# Patient Record
Sex: MALE | Race: WHITE | NOT HISPANIC OR LATINO | Employment: FULL TIME | URBAN - METROPOLITAN AREA
[De-identification: names, ages, dates, MRNs, and addresses within clinical notes are randomized per-mention and may not be internally consistent; named-entity substitution may affect disease eponyms.]

---

## 2022-05-31 ENCOUNTER — APPOINTMENT (OUTPATIENT)
Dept: CT IMAGING | Facility: CLINIC | Age: 42
End: 2022-05-31
Attending: EMERGENCY MEDICINE
Payer: COMMERCIAL

## 2022-05-31 ENCOUNTER — HOSPITAL ENCOUNTER (EMERGENCY)
Facility: CLINIC | Age: 42
Discharge: HOME | End: 2022-05-31
Attending: EMERGENCY MEDICINE | Admitting: EMERGENCY MEDICINE
Payer: COMMERCIAL

## 2022-05-31 VITALS
HEIGHT: 70 IN | DIASTOLIC BLOOD PRESSURE: 85 MMHG | BODY MASS INDEX: 23.62 KG/M2 | RESPIRATION RATE: 18 BRPM | OXYGEN SATURATION: 100 % | TEMPERATURE: 98.3 F | HEART RATE: 67 BPM | SYSTOLIC BLOOD PRESSURE: 126 MMHG | WEIGHT: 165 LBS

## 2022-05-31 DIAGNOSIS — J69.0 ASPIRATION PNEUMONITIS (H): ICD-10-CM

## 2022-05-31 DIAGNOSIS — K20.90 ESOPHAGITIS: ICD-10-CM

## 2022-05-31 DIAGNOSIS — R07.9 CHEST PAIN, UNSPECIFIED TYPE: ICD-10-CM

## 2022-05-31 LAB
ANION GAP SERPL CALCULATED.3IONS-SCNC: 7 MMOL/L (ref 3–14)
ATRIAL RATE - MUSE: 54 BPM
BASOPHILS # BLD AUTO: 0 10E3/UL (ref 0–0.2)
BASOPHILS NFR BLD AUTO: 1 %
BUN SERPL-MCNC: 20 MG/DL (ref 7–30)
CALCIUM SERPL-MCNC: 10 MG/DL (ref 8.5–10.1)
CHLORIDE BLD-SCNC: 101 MMOL/L (ref 94–109)
CO2 SERPL-SCNC: 29 MMOL/L (ref 20–32)
CREAT SERPL-MCNC: 0.88 MG/DL (ref 0.66–1.25)
DIASTOLIC BLOOD PRESSURE - MUSE: NORMAL MMHG
EOSINOPHIL # BLD AUTO: 0.1 10E3/UL (ref 0–0.7)
EOSINOPHIL NFR BLD AUTO: 2 %
ERYTHROCYTE [DISTWIDTH] IN BLOOD BY AUTOMATED COUNT: 12.8 % (ref 10–15)
GFR SERPL CREATININE-BSD FRML MDRD: >90 ML/MIN/1.73M2
GLUCOSE BLD-MCNC: 93 MG/DL (ref 70–99)
HCT VFR BLD AUTO: 45.7 % (ref 40–53)
HGB BLD-MCNC: 15.3 G/DL (ref 13.3–17.7)
HOLD SPECIMEN: NORMAL
HOLD SPECIMEN: NORMAL
IMM GRANULOCYTES # BLD: 0 10E3/UL
IMM GRANULOCYTES NFR BLD: 1 %
INTERPRETATION ECG - MUSE: NORMAL
LYMPHOCYTES # BLD AUTO: 1.3 10E3/UL (ref 0.8–5.3)
LYMPHOCYTES NFR BLD AUTO: 21 %
MAGNESIUM SERPL-MCNC: 2.6 MG/DL (ref 1.6–2.3)
MCH RBC QN AUTO: 30.5 PG (ref 26.5–33)
MCHC RBC AUTO-ENTMCNC: 33.5 G/DL (ref 31.5–36.5)
MCV RBC AUTO: 91 FL (ref 78–100)
MONOCYTES # BLD AUTO: 0.5 10E3/UL (ref 0–1.3)
MONOCYTES NFR BLD AUTO: 8 %
NEUTROPHILS # BLD AUTO: 4.4 10E3/UL (ref 1.6–8.3)
NEUTROPHILS NFR BLD AUTO: 67 %
NRBC # BLD AUTO: 0 10E3/UL
NRBC BLD AUTO-RTO: 0 /100
P AXIS - MUSE: 49 DEGREES
PLATELET # BLD AUTO: 223 10E3/UL (ref 150–450)
POTASSIUM BLD-SCNC: 3.5 MMOL/L (ref 3.4–5.3)
PR INTERVAL - MUSE: 154 MS
QRS DURATION - MUSE: 106 MS
QT - MUSE: 432 MS
QTC - MUSE: 409 MS
R AXIS - MUSE: 66 DEGREES
RBC # BLD AUTO: 5.01 10E6/UL (ref 4.4–5.9)
SODIUM SERPL-SCNC: 137 MMOL/L (ref 133–144)
SYSTOLIC BLOOD PRESSURE - MUSE: NORMAL MMHG
T AXIS - MUSE: 55 DEGREES
TROPONIN I SERPL HS-MCNC: <3 NG/L
VENTRICULAR RATE- MUSE: 54 BPM
WBC # BLD AUTO: 6.4 10E3/UL (ref 4–11)

## 2022-05-31 PROCEDURE — 80048 BASIC METABOLIC PNL TOTAL CA: CPT | Performed by: EMERGENCY MEDICINE

## 2022-05-31 PROCEDURE — 85025 COMPLETE CBC W/AUTO DIFF WBC: CPT | Performed by: EMERGENCY MEDICINE

## 2022-05-31 PROCEDURE — 250N000013 HC RX MED GY IP 250 OP 250 PS 637: Performed by: EMERGENCY MEDICINE

## 2022-05-31 PROCEDURE — 83735 ASSAY OF MAGNESIUM: CPT | Performed by: EMERGENCY MEDICINE

## 2022-05-31 PROCEDURE — 250N000009 HC RX 250: Performed by: EMERGENCY MEDICINE

## 2022-05-31 PROCEDURE — 70491 CT SOFT TISSUE NECK W/DYE: CPT

## 2022-05-31 PROCEDURE — 71260 CT THORAX DX C+: CPT

## 2022-05-31 PROCEDURE — 99285 EMERGENCY DEPT VISIT HI MDM: CPT | Mod: 25

## 2022-05-31 PROCEDURE — 84484 ASSAY OF TROPONIN QUANT: CPT | Performed by: EMERGENCY MEDICINE

## 2022-05-31 PROCEDURE — 250N000011 HC RX IP 250 OP 636: Performed by: EMERGENCY MEDICINE

## 2022-05-31 PROCEDURE — 36415 COLL VENOUS BLD VENIPUNCTURE: CPT | Performed by: EMERGENCY MEDICINE

## 2022-05-31 PROCEDURE — 93005 ELECTROCARDIOGRAM TRACING: CPT

## 2022-05-31 RX ORDER — IOPAMIDOL 755 MG/ML
500 INJECTION, SOLUTION INTRAVASCULAR ONCE
Status: COMPLETED | OUTPATIENT
Start: 2022-05-31 | End: 2022-05-31

## 2022-05-31 RX ORDER — ONDANSETRON 4 MG/1
4 TABLET, ORALLY DISINTEGRATING ORAL EVERY 8 HOURS PRN
Qty: 10 TABLET | Refills: 0 | Status: SHIPPED | OUTPATIENT
Start: 2022-05-31 | End: 2022-06-03

## 2022-05-31 RX ADMIN — AMOXICILLIN AND CLAVULANATE POTASSIUM 1 TABLET: 875; 125 TABLET, FILM COATED ORAL at 19:05

## 2022-05-31 RX ADMIN — SODIUM CHLORIDE 65 ML: 9 INJECTION, SOLUTION INTRAVENOUS at 16:45

## 2022-05-31 RX ADMIN — IOPAMIDOL 100 ML: 755 INJECTION, SOLUTION INTRAVENOUS at 16:45

## 2022-05-31 ASSESSMENT — ENCOUNTER SYMPTOMS
CHEST TIGHTNESS: 1
APPETITE CHANGE: 1
VOMITING: 1
SHORTNESS OF BREATH: 0
NAUSEA: 1

## 2022-05-31 NOTE — ED PROVIDER NOTES
History   Chief Complaint:  Chest Pain     The history is provided by the patient.      Vincent Porras is a 41 year old male with history of heartburn who presents with chest pain. Patient reports that at around 1030 today he was experiencing left sided chest pain that began to radiate towards the right side. This exacerbation lasted for 30 minutes, went away, then came back half as painful.  Approximately 15 minutes into the first exacerbation, patient states that the pain radiated to underneath his chin. Reports that deep breaths are accompanied by tightness and a tingling feeling behind his sternum.     States that two days ago he had multiple episodes of vomiting over a 3-4 hour period and has a loss of appetite since then. Patient experienced little to no chest pain yesterday. Denies experiencing trouble breathing, cold sweat, residual nausea, history of food sensitivity/allergies. Denies anyone else in household experiencing same symptoms.  Of note, patient stopped regularly smoking cigarettes 8-10 years ago and smokes e cigarettes currently. He casually smokes cigarettes. Reports former use of omeprazole but has not taken it for a few years.    Review of Systems   Constitutional: Positive for appetite change.   Respiratory: Positive for chest tightness. Negative for shortness of breath.    Cardiovascular: Positive for chest pain.   Gastrointestinal: Positive for nausea (resolved) and vomiting (resolved).   All other systems reviewed and are negative.      Allergies:  No Known Allergies    Medications:  Mucinex    Past Medical History:     Heartburn  ADHD  Kidney stone    Past Surgical History:    No past surgical history on file.     Family History:    Father- heart murmur, extra beats, stent placement  Mother- DM II    Social History:  Presents with wife  Presented by car    Physical Exam     Patient Vitals for the past 24 hrs:   BP Temp Pulse Resp SpO2 Height Weight   05/31/22 1900 126/85 -- 67 -- 100 % --  "--   05/31/22 1858 -- -- -- 18 -- -- --   05/31/22 1800 120/80 -- 64 -- 99 % -- --   05/31/22 1700 118/80 -- 56 -- 100 % -- --   05/31/22 1345 121/85 98.3  F (36.8  C) 60 16 100 % 1.778 m (5' 10\") 74.8 kg (165 lb)       Physical Exam  Constitutional: Vital signs reviewed as above.   Head: No external signs of trauma noted.  Eyes: Pupils are equal, round, and reactive to light.   Neck: No JVD noted. No subcutaneous crepitus noted  Cardiovascular: Normal rate on exam, regular rhythm and normal heart sounds.  No murmur heard. Equal B/L peripheral pulses.  Pulmonary/Chest: Effort normal and breath sounds normal. No respiratory distress. Patient has no wheezes. Patient has no rales. No chest crepitus noted.  Gastrointestinal: Soft. There is no tenderness.   Musculoskeletal/Extremities: No edema noted. Normal tone.  Neurological: Patient is alert and oriented to person, place, and time.   Skin: Skin is warm and dry. There is no diaphoresis noted.   Psychiatric: The patient appears calm.    Emergency Department Course   ECG  Reviewed by Dr. Davis at 1546  ECG results from 05/31/22   EKG 12-lead, tracing only     Value    Systolic Blood Pressure     Diastolic Blood Pressure     Ventricular Rate 54    Atrial Rate 54    MI Interval 154    QRS Duration 106        QTc 409    P Axis 49    R AXIS 66    T Axis 55    Interpretation ECG      Sinus bradycardia  Otherwise normal ECG  No previous ECGs available       Imaging:  Chest CT w IV contrast only, TRAUMA / DISSECTION   Final Result   IMPRESSION:       1.  Patchy left lung groundglass opacities within which there are several inflammatory nodules one of which has a central lucency consistent with cavitation. There are additional atelectatic bands in the lingula and left lower lobe. Given the provided    clinical history, findings most likely represent aspiration pneumonitis/pneumonia.   2.  Circumferential wall thickening of the distal esophagus consistent with esophagitis. " No findings to suggest esophageal perforation.      3.  Nonobstructing left upper pole renal calculi.      Soft tissue neck CT w contrast   Final Result   IMPRESSION:     1. No evidence of acute infectious or inflammatory process within the   neck.   2. No evidence of neck mass or cervical lymphadenopathy.   3. Multiple abnormal pulmonary opacities/nodules. Refer to chest   report.       LAM PARK MD            SYSTEM ID:  [id number]        Report per radiology    Laboratory:  Labs Ordered and Resulted from Time of ED Arrival to Time of ED Departure   MAGNESIUM - Abnormal       Result Value    Magnesium 2.6 (*)    BASIC METABOLIC PANEL - Normal    Sodium 137      Potassium 3.5      Chloride 101      Carbon Dioxide (CO2) 29      Anion Gap 7      Urea Nitrogen 20      Creatinine 0.88      Calcium 10.0      Glucose 93      GFR Estimate >90     TROPONIN I - Normal    Troponin I High Sensitivity <3     CBC WITH PLATELETS AND DIFFERENTIAL    WBC Count 6.4      RBC Count 5.01      Hemoglobin 15.3      Hematocrit 45.7      MCV 91      MCH 30.5      MCHC 33.5      RDW 12.8      Platelet Count 223      % Neutrophils 67      % Lymphocytes 21      % Monocytes 8      % Eosinophils 2      % Basophils 1      % Immature Granulocytes 1      NRBCs per 100 WBC 0      Absolute Neutrophils 4.4      Absolute Lymphocytes 1.3      Absolute Monocytes 0.5      Absolute Eosinophils 0.1      Absolute Basophils 0.0      Absolute Immature Granulocytes 0.0      Absolute NRBCs 0.0        Reviewed:  I reviewed nursing notes, vitals, past medical history and Care Everywhere    Assessments:  ED Course as of 05/31/22 1917 Tue May 31, 2022   1540 I obtained history and examined the patient.   1726 Rechecked patient.   1849 D/W Dr. Mckenna.  The admission decision is up to me in the emergency department, however she would not be against an antibiotic course of Augmentin for perhaps even 2 to 4 weeks with a follow-up CT scan outpatient.   1856  Updated patient and wife.       Interventions:  1905 Amoxicillin-clavulanate, 1 tablet, oral    Disposition:  The patient was discharged to home.     Impression & Plan   Medical Decision Making:  Vincent Porras is a 41 year old male presented to the Emergency Department with a complaint of chest pain. Fortunately the workup in the ED has been unremarkable and at this time I am not concerned for ACS. The EKG shows sinus bradycardia (no old ECG). The troponin is negative. Based on the [facility] high sensitivity troponin pathway, this should rule the patient out for myocardial injury     I considered other possible causes of chest pain including PE (low risk Well's Criteria), infection, pneumothorax, aortic dissection, esophageal perforation, and even more benign causes such as reflux and esophageal motility issues. The physical exam, laboratory, and radiological findings listed above make life threatening conditions less likely.  His imaging does note inflammatory changes possibly consistent with pneumonitis as well as some nodules with potential cavitation.     I think it is reasonable to treat the patient with antibiotics and have him follow-up in clinic in 1 to 2 weeks.  He should continue to monitor for changes in his symptoms including fever, shortness of breath, productive cough, shaking chills, etc.  He can consider a repeat CT scan in 2 to 4 weeks as well.  I will also send a prescription for omeprazole due to the esophageal changes seen on CT.  He states that he used to take some kind of proton pump inhibitor but has not recently and has been using Tums.     Anticipatory guidance given prior to discharge.    Critical Care Time: was 0 (Zero) minutes for this patient excluding procedures    Diagnosis:    ICD-10-CM    1. Chest pain, unspecified type  R07.9    2. Aspiration pneumonitis (H)  J69.0    3. Esophagitis  K20.90        Discharge Medications:  Discharge Medication List as of 5/31/2022  6:58 PM       START taking these medications    Details   amoxicillin-clavulanate (AUGMENTIN) 875-125 MG tablet Take 1 tablet by mouth 2 times daily, Disp-28 tablet, R-0, E-Prescribe      omeprazole (PRILOSEC) 20 MG DR capsule Take 1 capsule (20 mg) by mouth daily, Disp-30 capsule, R-0, E-Prescribe      ondansetron (ZOFRAN ODT) 4 MG ODT tab Take 1 tablet (4 mg) by mouth every 8 hours as needed for nausea or vomiting, Disp-10 tablet, R-0, E-Prescribe             Scribe Disclosure:  NELA, Milena Dooley & Judson Sinclair, am serving as a scribe at 3:33 PM on 5/31/2022 to document services personally performed by Cirilo Davis DO based on my observations and the provider's statements to me.            Cirilo Davis DO  05/31/22 2055

## 2022-05-31 NOTE — DISCHARGE INSTRUCTIONS
"What do you do next:   Continue your home medications unless we have specifically changed them  Take the \"omeprazole\" that I have prescribed as directed.  This can help reduce some of the \"esophagitis\" found on your CT scan.  Take the antibiotics I prescribed as directed.  When you call your primary care clinic, schedule follow-up appointment for about 2 weeks from today and you may consider repeat CT scan at that point and whether or not antibiotics would need to be continued.  Follow up as indicated below    When do you return: If you have uncontrollable fevers, shaking chills, severe shortness of breath, uncontrollable chest pain, or any other symptoms that concern you, please return to the ED for reevaluation.    Thank you for allowing us to care for you today.    "

## 2022-05-31 NOTE — ED TRIAGE NOTES
Pt presents for complaint of left sided chest pain. Pt states this started Sunday and was initially extreme. States he was also nauseated and vomiting with that pain. States the pain has mostly resolved at this point, but does note some pain in the left shoulder when walking. Seen at  and sent in for evaluation. ABC intact, A&Ox4.     Triage Assessment     Row Name 05/31/22 9418       Triage Assessment (Adult)    Airway WDL WDL       Respiratory WDL    Respiratory WDL WDL       Skin Circulation/Temperature WDL    Skin Circulation/Temperature WDL WDL       Cardiac WDL    Cardiac WDL chest pain       Chest Pain Assessment    Chest Pain Location anterior chest, left    Chest Pain Radiation jaw;shoulder    Precipitating Factors activity       Peripheral/Neurovascular WDL    Peripheral Neurovascular WDL WDL       Cognitive/Neuro/Behavioral WDL    Cognitive/Neuro/Behavioral WDL WDL